# Patient Record
Sex: FEMALE | Race: WHITE | NOT HISPANIC OR LATINO | Employment: FULL TIME | URBAN - METROPOLITAN AREA
[De-identification: names, ages, dates, MRNs, and addresses within clinical notes are randomized per-mention and may not be internally consistent; named-entity substitution may affect disease eponyms.]

---

## 2017-01-24 ENCOUNTER — TELEPHONE (OUTPATIENT)
Dept: MEDICAL GROUP | Facility: MEDICAL CENTER | Age: 32
End: 2017-01-24

## 2017-01-24 DIAGNOSIS — Z71.84 COUNSELING FOR TRAVEL: ICD-10-CM

## 2017-01-24 RX ORDER — AZITHROMYCIN 250 MG/1
TABLET, FILM COATED ORAL
Qty: 6 TAB | Refills: 0 | Status: SHIPPED | OUTPATIENT
Start: 2017-01-24 | End: 2017-09-20

## 2017-01-24 NOTE — TELEPHONE ENCOUNTER
Pt would like an Rx for Z-Damien because of a trip to [facility] coming up soon. Doesn't know if she needs to make an apt or not. She also changed her last name recently and would like to update it in our system.

## 2017-01-24 NOTE — TELEPHONE ENCOUNTER
zpak sent to pharmacy.  zpak is helpful for pneumonia, sinus infection, ear infection, strep throat or chlamydia exposure.  She should not take if her mucus is clear, upper respiratory tract infection symptoms have been present for less than 72 hours, or she just has a basic cold.

## 2017-01-25 DIAGNOSIS — A09 TRAVELER'S DIARRHEA: ICD-10-CM

## 2017-01-25 RX ORDER — CIPROFLOXACIN 500 MG/1
500 TABLET, FILM COATED ORAL 2 TIMES DAILY
Qty: 20 TAB | Refills: 0 | Status: SHIPPED | OUTPATIENT
Start: 2017-01-25 | End: 2017-02-04

## 2017-01-25 NOTE — TELEPHONE ENCOUNTER
Pt called back and states the message left for her didn't mention if the zpak could be used for a stomach bug, which is why she was requesting it.  She states if there is another abx that is more appropriate for this issue, she would like that one prescribed instead.

## 2017-01-25 NOTE — TELEPHONE ENCOUNTER
"Ok, please cancel zpak @ her pharmacy - thank you.  For GI bacterial illness, we utilize cipro.  cipro should be taken if diarrhea lasts greater than 48 hours and is associated with fever / chills / nausea / malaise.  cipro would not take the place of seeking medical care while traveling if symptoms do not improve within the first 3 days.  Avoid drinking tap water, eating salads or eating non-fried \"street foods.\"  Only eat fruit that can be peeled.    "

## 2017-03-13 DIAGNOSIS — B37.31 VAGINAL CANDIDA: ICD-10-CM

## 2017-03-13 RX ORDER — FLUCONAZOLE 150 MG/1
150 TABLET ORAL DAILY
Qty: 1 TAB | Refills: 3 | Status: SHIPPED | OUTPATIENT
Start: 2017-03-13 | End: 2017-09-20

## 2017-03-13 NOTE — TELEPHONE ENCOUNTER
Was the patient seen in the last year in this department? Yes     Does patient have an active prescription for medications requested? No     Received Request Via: Patient     Last visit:9/14/16

## 2017-09-20 ENCOUNTER — OFFICE VISIT (OUTPATIENT)
Dept: MEDICAL GROUP | Facility: PHYSICIAN GROUP | Age: 32
End: 2017-09-20
Payer: COMMERCIAL

## 2017-09-20 VITALS
HEART RATE: 68 BPM | OXYGEN SATURATION: 100 % | RESPIRATION RATE: 16 BRPM | SYSTOLIC BLOOD PRESSURE: 118 MMHG | TEMPERATURE: 97.4 F | DIASTOLIC BLOOD PRESSURE: 74 MMHG | HEIGHT: 66 IN

## 2017-09-20 DIAGNOSIS — N64.4 MASTALGIA: ICD-10-CM

## 2017-09-20 PROCEDURE — 99214 OFFICE O/P EST MOD 30 MIN: CPT | Performed by: PHYSICIAN ASSISTANT

## 2017-09-20 RX ORDER — NAPROXEN 500 MG/1
500 TABLET ORAL 2 TIMES DAILY WITH MEALS
Qty: 60 TAB | Refills: 0 | Status: SHIPPED | OUTPATIENT
Start: 2017-09-20

## 2017-09-20 ASSESSMENT — PATIENT HEALTH QUESTIONNAIRE - PHQ9: CLINICAL INTERPRETATION OF PHQ2 SCORE: 0

## 2017-09-20 ASSESSMENT — PAIN SCALES - GENERAL: PAINLEVEL: NO PAIN

## 2017-09-20 NOTE — ASSESSMENT & PLAN NOTE
Pt.  for one month she has been experiencing bilateral breast tenderness with mild swelling. States pain is located at lower outer quadrants. Denies nipple discharge, nipple retraction, erythema. States she has been taking OTC ibuprofen and receives by alleviation when doing so. States pain is most noticeable in the a.m. and with palpation. States pain is a constant, aching, non radiating pain that is exacerbated with palpation. [facility] she was taking OTC Flonase for 1 month but discontinued 5 days ago and experienced no changes in breast tenderness. Denies being pregnant. [facility] she has had an IUD for 5 years and is following up with her gynecologist 10/09/17 for a replacement. Patient admits to exercising daily. [facility] exercise routine consists of cross fit for one hour per day. [facility] she wears a supportive bra while exercising. Denies diet changes, trauma, skin changes, changes in position of the nipples, scars, skin retraction, dimpling, edema or erythema, ulceration or crusting of the nipple, enlarged axillary, supraclavicular, or infraclavicular lymph nodes. Denies fever, chills, night sweats, nausea, vomiting, unintentional weight loss, diarrhea, abdominal pain, chest pain, shortness of breath.

## 2017-09-20 NOTE — PROGRESS NOTES
Chief Complaint   Patient presents with   • Breast Pain     Tenderness and swelling in left breast x 1 month       HISTORY OF PRESENT ILLNESS: Mary Spence is an established 32 y.o. female here to discuss the evaluation and management of:    Mastalgia  Pt. States for one month she has been experiencing bilateral breast tenderness with mild swelling. States pain is located at lower outer quadrants. Denies nipple discharge, nipple retraction, erythema. States she has been taking OTC ibuprofen and receives by alleviation when doing so. States pain is most noticeable in the a.m. and with palpation. States pain is a constant, aching, non radiating pain that is exacerbated with palpation. States she was taking OTC Flonase for 1 month but discontinued 5 days ago and experienced no changes in breast tenderness. Denies being pregnant. [facility] she has had an IUD for 5 years and is following up with her gynecologist 10/09/17 for a replacement. Patient admits to exercising daily. [facility] exercise routine consists of cross fit for one hour per day. [facility] she wears a supportive bra while exercising. Denies diet changes, trauma, skin changes, changes in position of the nipples, scars, skin retraction, dimpling, edema or erythema, ulceration or crusting of the nipple, enlarged axillary, supraclavicular, or infraclavicular lymph nodes. Denies fever, chills, night sweats, nausea, vomiting, unintentional weight loss, diarrhea, abdominal pain, chest pain, shortness of breath.       Patient Active Problem List    Diagnosis Date Noted   • Mastalgia 09/20/2017       Allergies:Nkda [no known drug allergy]    Current Outpatient Prescriptions   Medication Sig Dispense Refill   • naproxen (NAPROSYN) 500 MG Tab Take 1 Tab by mouth 2 times a day, with meals. 60 Tab 0     No current facility-administered medications for this visit.        Social History   Substance Use Topics   • Smoking status: Never Smoker   • Smokeless tobacco: Never  "Used   • Alcohol use Yes      Comment: occ       Family Status   Relation Status   • Mother Alive   • Father Alive   • Brother Alive   History reviewed. No pertinent family history.    ROS:  Review of Systems   Constitutional: Negative for fever, chills, weight loss and malaise/fatigue.   HENT: Negative for ear pain, nosebleeds, congestion, sore throat and neck pain.    Eyes: Negative for blurred vision.   Respiratory: Negative for cough, sputum production, shortness of breath and wheezing.    Cardiovascular: Negative for chest pain, palpitations, orthopnea and leg swelling.   Gastrointestinal: Negative for heartburn, nausea, vomiting and abdominal pain.   Genitourinary: Negative for dysuria, urgency and frequency.   Musculoskeletal: Negative for myalgias, back pain and joint pain.   Skin: Negative for rash and itching.   Neurological: Negative for dizziness, tingling, tremors, sensory change, focal weakness and headaches.   Endo/Heme/Allergies: Does not bruise/bleed easily.   Psychiatric/Behavioral: Negative for depression, suicidal ideas and memory loss.  The patient is not nervous/anxious and does not have insomnia.    All other systems reviewed and are negative except as in HPI.  Breast: Positive for bilateral Breast Pain. Negative for nipple retraction, nipple discharge, skin changes or dimpling.      Exam: Blood pressure 118/74, pulse 68, temperature 36.3 °C (97.4 °F), resp. rate 16, height 1.676 m (5' 6\"), SpO2 100 %, not currently breastfeeding. There is no height or weight on file to calculate BMI.  General: Normal appearing. No distress.  HEENT: Normocephalic. Eyes conjunctiva clear lids without ptosis, pupils equal and reactive to light accommodation, ears normal shape and contour, canals are clear bilaterally, tympanic membranes are benign, nasal mucosa benign, oropharynx is without erythema, edema or exudates.   Neck: Supple without JVD or bruit. Thyroid is not enlarged.  Pulmonary: Clear to " ausculation.  Normal effort. No rales, ronchi, or wheezing.  Cardiovascular: Regular rate and rhythm without murmur. Carotid and radial pulses are intact and equal bilaterally.  Abdomen: Soft, nontender, nondistended. Normal bowel sounds. Liver and spleen are not palpable  Neurologic: Grossly nonfocal.  Cranial nerves are normal. DTR's normal and symmetric.  Lymph: No cervical, supraclavicular or axillary lymph nodes are palpable  Skin: Warm and dry.  No rashes or suspicious skin lesions.  Musculoskeletal: Normal gait. No extremity cyanosis, clubbing, or edema.  Psych: Normal mood and affect. Alert and oriented x3. Judgment and insight is normal.  Breast:  Bilaterally symmetrical, no nipple discharge, no skin changes or dimpling are  noted.  Both breasts are free of palpable pathology and the axillas are free of lymphadenopathy. Left and right outer breast quadrants are tender to palpation. Negative for edema or erythema. Negative for ulceration or crusting of the nipple.    Medical decision-making and discussion:  1. Mastalgia  Patient was prescribed approximately 500 mg tab with meals twice a day. Discussed adverse reactions and common side effects of medicaiton with patient. Discussed with patient fibrocystic breast changes and cyclic breast changes. Advised patient to continue wearing supportive bras, to take NSAID's as prescribed, to use heating pad as needed. Advised patient to follow-up with gynecologist for replacement of IUD and to continue working on diet and exercise. Patient if symptoms do not improve or get worse to make an appointment to be further evaluated.       Please note that this dictation was created using voice recognition software. I have made every reasonable attempt to correct obvious errors, but I expect that there are errors of grammar and possibly content that I did not discover before finalizing the note.    Assessment/Plan:  1. Mastalgia  naproxen (NAPROSYN) 500 MG Tab       Return if  symptoms worsen or fail to improve.

## 2018-09-19 NOTE — ADDENDUM NOTE
Addended by: GILSON ROBLES on: 1/24/2017 09:44 AM     Modules accepted: Orders     nondistended/soft